# Patient Record
Sex: MALE | Race: WHITE | NOT HISPANIC OR LATINO | Employment: OTHER | URBAN - METROPOLITAN AREA
[De-identification: names, ages, dates, MRNs, and addresses within clinical notes are randomized per-mention and may not be internally consistent; named-entity substitution may affect disease eponyms.]

---

## 2017-09-09 ENCOUNTER — HOSPITAL ENCOUNTER (EMERGENCY)
Facility: HOSPITAL | Age: 52
Discharge: HOME | End: 2017-09-09
Payer: MEDICAID

## 2017-09-09 VITALS
SYSTOLIC BLOOD PRESSURE: 124 MMHG | TEMPERATURE: 98 F | HEIGHT: 69 IN | HEART RATE: 87 BPM | RESPIRATION RATE: 18 BRPM | BODY MASS INDEX: 19.99 KG/M2 | DIASTOLIC BLOOD PRESSURE: 83 MMHG | WEIGHT: 135 LBS

## 2017-09-09 DIAGNOSIS — L73.9 FOLLICULITIS: Primary | ICD-10-CM

## 2017-09-09 PROCEDURE — 99283 EMERGENCY DEPT VISIT LOW MDM: CPT

## 2017-09-09 RX ORDER — SULFAMETHOXAZOLE AND TRIMETHOPRIM 800; 160 MG/1; MG/1
1 TABLET ORAL 2 TIMES DAILY
Qty: 20 TABLET | Refills: 0 | Status: SHIPPED | OUTPATIENT
Start: 2017-09-09 | End: 2017-09-19

## 2017-09-09 NOTE — ED PROVIDER NOTES
SCRIBE #1 NOTE: I, Bing Mejia, am scribing for, and in the presence of,DENISE Fields . I have scribed the entire note.      History      Chief Complaint   Patient presents with    Wound Check     patient states his scar from May 2017 surgery itches and is red and swollen       Review of patient's allergies indicates:  No Known Allergies     HPI   HPI    9/9/2017, 3:03 PM   History obtained from the patient      History of Present Illness: Sagar Gabriel is a 52 y.o. male patient who presents to the Emergency Department for wound check. Pt states that he is experiencing itching over surgical scar  From may 2017. Symptoms are constant and moderate in severity.  No mitigating or exacerbating factors reported. Associated sxs include redness and swelling to site. Patient denies any fever, chills, increased warmth, drainage from the site, numbness/tingling, and all other sxs at this time. No prior Tx reported. No further complaints or concerns at this time.         Arrival mode: Personal vehicle     PCP: Sandra Mayo MD       Past Medical History:  No past medical history on file.    Past Surgical History:  No past surgical history on file.      Family History:  No family history on file.    Social History:  Social History     Social History Main Topics    Smoking status: Not on file    Smokeless tobacco: Not on file    Alcohol use Not on file    Drug use: Unknown    Sexual activity: Not on file       ROS   Review of Systems   Constitutional: Negative for chills and fever.   HENT: Negative for sore throat.    Respiratory: Negative for shortness of breath.    Cardiovascular: Negative for chest pain.   Gastrointestinal: Negative for nausea.   Genitourinary: Negative for dysuria.   Musculoskeletal: Negative for back pain.   Skin: Negative for rash.        - increased warmth  - drainage from the site  + itching   + redness  + swelling    Neurological: Negative for weakness and numbness.        - tingling   "  Hematological: Does not bruise/bleed easily.       Physical Exam      Initial Vitals [09/09/17 1454]   BP Pulse Resp Temp SpO2   124/83 87 18 97.8 °F (36.6 °C) --      MAP       96.67          Physical Exam  Nursing Notes and Vital Signs Reviewed.  Constitutional: Patient is in no apparent distress. Well-developed and well-nourished.  Head: Atraumatic. Normocephalic. 15 cm surgical scar to the scalp.  Partial craniectomy to L parietal skull. 1cm x 1cm area of tenderness and erythema.   Eyes: PERRL. EOM intact. Conjunctivae are not pale. No scleral icterus.  ENT: Mucous membranes are moist. Oropharynx is clear and symmetric.    Neck: Supple. Full ROM. No lymphadenopathy.  Cardiovascular: Regular rate. Regular rhythm. No murmurs, rubs, or gallops. Distal pulses are 2+ and symmetri.  Pulmonary/Chest: No respiratory distress. Clear to auscultation bilaterally. No wheezing, rales, or rhonchi.  Abdominal: Soft and non-distended.  There is no tenderness.  No rebound, guarding, or rigidity. Good bowel sounds.  Genitourinary: No CVA tenderness  Musculoskeletal: R below the knee amputee. No obvious deformities. No edema. No calf tenderness.  Skin: Warm and dry.  Neurological:  Alert, awake, and appropriate.  Normal speech.  No acute focal neurological deficits are appreciated.  Psychiatric: Normal affect. Good eye contact. Appropriate in content.    ED Course    Procedures  ED Vital Signs:  Vitals:    09/09/17 1454   BP: 124/83   Pulse: 87   Resp: 18   Temp: 97.8 °F (36.6 °C)   TempSrc: Oral   Weight: 61.2 kg (135 lb)   Height: 5' 9" (1.753 m)              The Emergency Provider reviewed the vital signs and test results, which are outlined above.    ED Discussion     3:07 PM:  Discussed with pt all pertinent ED information and results. Discussed pt dx and plan of tx. Gave pt all f/u and return to the ED instructions. All questions and concerns were addressed at this time. Pt expresses understanding of information and " instructions, and is comfortable with plan to discharge. Pt is stable for discharge.    I discussed wound care precautions with patient and/or family/caretaker; specifically that all wounds have risk of infection despite efforts to cleanse and debride the wound; and there is a risk of an occult foreign body (and thus increased risk of infection) despite a negative examination.  I discussed with patient need to return for any signs of infection, specifically redness, increased pain, fever, drainage of pus, or any concern, immediately.      ED Medication(s):  Medications - No data to display    New Prescriptions    No medications on file             Medical Decision Making              Scribe Attestation:   Scribe #1: I performed the above scribed service and the documentation accurately describes the services I performed. I attest to the accuracy of the note.    Attending:   Physician Attestation Statement for Scribe #1: I, DENISE Fields, personally performed the services described in this documentation, as scribed by , in my presence, and it is both accurate and complete.          Clinical Impression       ICD-10-CM ICD-9-CM   1. Folliculitis L73.9 704.8       Disposition:   Disposition: Discharged  Condition: Stable         DENISE Talley  09/09/17 1516

## 2017-09-13 ENCOUNTER — TELEPHONE (OUTPATIENT)
Dept: ORTHOPEDICS | Facility: CLINIC | Age: 52
End: 2017-09-13
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
## 2017-09-13 NOTE — TELEPHONE ENCOUNTER
Spoke with pt's significant other, Leticia.  States she was told that Dr Sanchez had 5 openings for New Medicaid pts.    She did not remember the name of the person that told her this info.l  Advised Leticia that DENISE Graham is not a MD.  She has no New Pt appointments for Medicaid pts until Jan 2018   That schedule is not yet available.

## 2018-03-16 DIAGNOSIS — M25.511 RIGHT SHOULDER PAIN, UNSPECIFIED CHRONICITY: Primary | ICD-10-CM

## 2018-03-19 ENCOUNTER — HOSPITAL ENCOUNTER (OUTPATIENT)
Dept: RADIOLOGY | Facility: HOSPITAL | Age: 53
Discharge: HOME | End: 2018-03-19
Attending: PHYSICIAN ASSISTANT
Payer: MEDICAID

## 2018-03-19 ENCOUNTER — OFFICE VISIT (OUTPATIENT)
Dept: ORTHOPEDICS | Facility: CLINIC | Age: 53
End: 2018-03-19
Payer: MEDICAID

## 2018-03-19 VITALS
BODY MASS INDEX: 24.34 KG/M2 | SYSTOLIC BLOOD PRESSURE: 112 MMHG | WEIGHT: 170 LBS | HEART RATE: 99 BPM | HEIGHT: 70 IN | DIASTOLIC BLOOD PRESSURE: 82 MMHG

## 2018-03-19 DIAGNOSIS — M12.811 ROTATOR CUFF ARTHROPATHY, RIGHT: ICD-10-CM

## 2018-03-19 DIAGNOSIS — M25.511 RIGHT SHOULDER PAIN, UNSPECIFIED CHRONICITY: ICD-10-CM

## 2018-03-19 DIAGNOSIS — S14.3XXA INJURY OF RIGHT BRACHIAL PLEXUS, INITIAL ENCOUNTER: Primary | ICD-10-CM

## 2018-03-19 DIAGNOSIS — S43.431S SUPERIOR GLENOID LABRUM LESION OF RIGHT SHOULDER, SEQUELA: ICD-10-CM

## 2018-03-19 DIAGNOSIS — Z78.9 HISTORY OF MOTORCYCLE ACCIDENT: ICD-10-CM

## 2018-03-19 PROCEDURE — 99204 OFFICE O/P NEW MOD 45 MIN: CPT | Mod: S$PBB,,, | Performed by: PHYSICIAN ASSISTANT

## 2018-03-19 PROCEDURE — 99214 OFFICE O/P EST MOD 30 MIN: CPT | Mod: PBBFAC,25 | Performed by: PHYSICIAN ASSISTANT

## 2018-03-19 PROCEDURE — 99999 PR PBB SHADOW E&M-EST. PATIENT-LVL IV: CPT | Mod: PBBFAC,,, | Performed by: PHYSICIAN ASSISTANT

## 2018-03-19 PROCEDURE — 73030 X-RAY EXAM OF SHOULDER: CPT | Mod: TC,RT

## 2018-03-19 PROCEDURE — 73030 X-RAY EXAM OF SHOULDER: CPT | Mod: 26,RT,, | Performed by: RADIOLOGY

## 2018-03-19 RX ORDER — VENLAFAXINE 75 MG/1
150 TABLET ORAL 2 TIMES DAILY
COMMUNITY
Start: 2018-03-08

## 2018-03-19 RX ORDER — TRAMADOL HYDROCHLORIDE 50 MG/1
100 TABLET ORAL 2 TIMES DAILY
COMMUNITY
Start: 2018-03-08 | End: 2019-03-08

## 2018-03-19 RX ORDER — GABAPENTIN 600 MG/1
1200 TABLET ORAL 3 TIMES DAILY
COMMUNITY
Start: 2018-03-08 | End: 2019-03-08

## 2018-03-19 RX ORDER — IBUPROFEN 600 MG/1
600 TABLET ORAL 2 TIMES DAILY PRN
COMMUNITY

## 2018-03-19 RX ORDER — QUETIAPINE FUMARATE 300 MG/1
300 TABLET, FILM COATED ORAL NIGHTLY
COMMUNITY
Start: 2018-03-08

## 2018-03-19 RX ORDER — DANTROLENE SODIUM 100 MG/1
100 CAPSULE ORAL 3 TIMES DAILY
COMMUNITY

## 2018-03-19 RX ORDER — FAMOTIDINE 20 MG/1
20 TABLET, FILM COATED ORAL 2 TIMES DAILY PRN
COMMUNITY
Start: 2018-03-08 | End: 2019-03-08
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
## 2018-04-10 ENCOUNTER — TELEPHONE (OUTPATIENT)
Dept: ORTHOPEDICS | Facility: CLINIC | Age: 53
End: 2018-04-10

## 2018-04-10 NOTE — TELEPHONE ENCOUNTER
Returned pt mom call. Pt mother wanted to know if we received the EMG she dropped off. Advised her it was received. Pt mother requested a call back from provider to discuss the next step.

## 2018-04-10 NOTE — TELEPHONE ENCOUNTER
----- Message from Mar Mack sent at 4/10/2018  2:34 PM CDT -----  Contact: [place] - [phone]  Would like to consult with the nurse about reports, please call back at [phone].  Thjx-AH

## 2018-04-19 ENCOUNTER — TELEPHONE (OUTPATIENT)
Dept: ORTHOPEDICS | Facility: CLINIC | Age: 53
End: 2018-04-19

## 2018-04-19 NOTE — TELEPHONE ENCOUNTER
Spoke with pt letting him know that I will speak with Chely tomorrow morning and we will give him a call back.

## 2018-04-19 NOTE — TELEPHONE ENCOUNTER
----- Message from Salud Biswas sent at 4/19/2018  8:43 AM CDT -----  Contact: pt  He's calling to verify if he will be scheduled for surgery, please advise [phone] (work)

## 2018-04-25 ENCOUNTER — TELEPHONE (OUTPATIENT)
Dept: ORTHOPEDICS | Facility: CLINIC | Age: 53
End: 2018-04-25

## 2018-04-25 DIAGNOSIS — S14.3XXA INJURY OF RIGHT BRACHIAL PLEXUS, INITIAL ENCOUNTER: Primary | ICD-10-CM

## 2018-04-25 NOTE — TELEPHONE ENCOUNTER
Returned pt phone call.pt was unavailable. Left message for pt to call back at earliest convenience. Pt needs referral to  in [facility]

## 2018-04-25 NOTE — TELEPHONE ENCOUNTER
----- Message from Joan Saxena sent at 4/24/2018  6:08 PM CDT -----  Contact: self   [phone]  Pt is calling to speak with the nurse with the nurse concerning pt surgery date.      Please give a call back    Thank you!

## 2018-04-30 DIAGNOSIS — R93.7 ABNORMAL X-RAY OF CERVICAL SPINE: ICD-10-CM

## 2018-05-09 ENCOUNTER — HOSPITAL ENCOUNTER (OUTPATIENT)
Dept: RADIOLOGY | Facility: HOSPITAL | Age: 53
Discharge: HOME | End: 2018-05-09
Attending: PHYSICIAN ASSISTANT
Payer: MEDICAID

## 2018-05-09 DIAGNOSIS — R93.7 ABNORMAL X-RAY OF CERVICAL SPINE: ICD-10-CM

## 2018-05-09 PROCEDURE — 72141 MRI NECK SPINE W/O DYE: CPT | Mod: TC

## 2018-06-11 ENCOUNTER — TELEPHONE (OUTPATIENT)
Dept: SPINE | Facility: CLINIC | Age: 53
End: 2018-06-11

## 2018-06-11 DIAGNOSIS — M54.2 NECK PAIN: Primary | ICD-10-CM

## 2018-06-12 ENCOUNTER — TELEPHONE (OUTPATIENT)
Dept: SPINE | Facility: CLINIC | Age: 53
End: 2018-06-12

## 2018-06-12 NOTE — TELEPHONE ENCOUNTER
----- Message from Coretta Tapia sent at 6/12/2018  9:55 AM CDT -----  Contact: pt's mother,sarah            Name of Who is Calling:Sarah      What is the request in detail: pt's mother requesting a later time for pt's appt today or if he can be seen tomorrow. Pt's  was unable to pick him up and bring him at the time. Please call and advise/      Can the clinic reply by [facility]: no      What Number to Call Back if not in [facility]: [phone]

## 2018-06-26 ENCOUNTER — OFFICE VISIT (OUTPATIENT)
Dept: SPINE | Facility: CLINIC | Age: 53
End: 2018-06-26
Attending: NEUROLOGICAL SURGERY
Payer: MEDICAID

## 2018-06-26 VITALS — SYSTOLIC BLOOD PRESSURE: 132 MMHG | HEART RATE: 77 BPM | HEIGHT: 70 IN | DIASTOLIC BLOOD PRESSURE: 85 MMHG

## 2018-06-26 DIAGNOSIS — S14.3XXA INJURY OF RIGHT BRACHIAL PLEXUS, INITIAL ENCOUNTER: ICD-10-CM

## 2018-06-26 DIAGNOSIS — S49.90XA SHOULDER INJURY, INITIAL ENCOUNTER: Primary | ICD-10-CM

## 2018-06-26 PROCEDURE — 99999 PR PBB SHADOW E&M-EST. PATIENT-LVL III: CPT | Mod: PBBFAC,,, | Performed by: NEUROLOGICAL SURGERY

## 2018-06-26 PROCEDURE — 99205 OFFICE O/P NEW HI 60 MIN: CPT | Mod: S$PBB,,, | Performed by: NEUROLOGICAL SURGERY

## 2018-06-26 PROCEDURE — 99213 OFFICE O/P EST LOW 20 MIN: CPT | Mod: PBBFAC | Performed by: NEUROLOGICAL SURGERY

## 2018-06-26 NOTE — LETTER
June 26, 2018      Chely Hi PA-C  [address] [place] [address]           [facility]  [address] [place] [address], [address]  [facility] [address]  Phone: [phone]  Fax: [phone]          Patient: Sagar Gabriel   MR Number: [id number]   YOB: 1965   Date of Visit: 6/26/2018       Dear Chely Hi:    Thank you for referring Sagar Gabriel to me for evaluation. Attached you will find relevant portions of my assessment and plan of care.    If you have questions, please do not hesitate to call me. I look forward to following Sagar Gabriel along with you.    Sincerely,    Luis Angel Hough MD    Enclosure  CC:  No Recipients    If you would like to receive this communication electronically, please contact [email or web address] or [phone] to request more information on [employer] access.    For providers and/or their staff who would like to refer a patient to [facility], please contact us through our one-stop-shop provider referral line, [facility], at [phone].    If you feel you have received this communication in error or would no longer like to receive these types of communications, please e-mail [email or web address]
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
## 2018-06-28 ENCOUNTER — TELEPHONE (OUTPATIENT)
Dept: ORTHOPEDICS | Facility: CLINIC | Age: 53
End: 2018-06-28

## 2018-06-28 NOTE — TELEPHONE ENCOUNTER
----- Message from Isabel Da Silva sent at 6/28/2018 12:52 PM CDT -----  Contact: Patients wife, Sarah Smith states that the Dr In [place] said he does not see her husbands condition, advised that patient needs an orthopedic surgeon. Please call Ms Smith regarding this, please call her back at [phone]. Thank you

## 2018-06-28 NOTE — TELEPHONE ENCOUNTER
Returned pt mother's phone call. Pt mother states pt needs to see an orthopedic surgeon for his shoulder. Advised pt mother I would speak with our surgeons here in clinic and see if they know anyone that could do it.

## 2018-06-29 ENCOUNTER — TELEPHONE (OUTPATIENT)
Dept: ORTHOPEDICS | Facility: CLINIC | Age: 53
End: 2018-06-29

## 2018-06-29 NOTE — TELEPHONE ENCOUNTER
Pt mother would like pt to see an orthopedic surgeon in [place] rather than [place]. Advised pt mother a referral to [facility] was sent and she should receive a call sometime next week. Pt mother vocalized understanding.

## 2018-07-17 ENCOUNTER — TELEPHONE (OUTPATIENT)
Dept: ORTHOPEDICS | Facility: CLINIC | Age: 53
End: 2018-07-17

## 2018-07-17 NOTE — TELEPHONE ENCOUNTER
"[facility]ed [facility] was [facility].   Referral was denied and stated "he is not a surgical candidate"  Referral denial is being scanned into media.     Called [facility] ortho (dept that Stephane Hough originally sent for pt) they are not accepting medicaid at this time.       Called pt's mother to inform her of denial.   Told her that I would be speaking to my management team on how to proceed.   Mother verbalized understanding.    "

## 2018-07-17 NOTE — TELEPHONE ENCOUNTER
----- Message from Suzy Joy sent at 7/17/2018 10:23 AM CDT -----  Contact: Sarah Nery/mother  States she's calling regarding someone is suppose to call with an orthopedic surgeon he can see. Please call Sarah Gabriel at [phone]. Thank you

## 2018-07-26 ENCOUNTER — TELEPHONE (OUTPATIENT)
Dept: ORTHOPEDICS | Facility: CLINIC | Age: 53
End: 2018-07-26

## 2018-07-26 NOTE — TELEPHONE ENCOUNTER
----- Message from Sha Pugh sent at 7/26/2018 10:44 AM CDT -----  Contact: Pt/Mom   Please give pt mom a call at [phone] regarding the pt shoulder.

## 2018-07-26 NOTE — TELEPHONE ENCOUNTER
Returned call to patients mom and informed her that we will get with our manager after his meeting and call her back after we talk to him.

## 2018-07-31 ENCOUNTER — TELEPHONE (OUTPATIENT)
Dept: ORTHOPEDICS | Facility: CLINIC | Age: 53
End: 2018-07-31

## 2018-08-13 ENCOUNTER — TELEPHONE (OUTPATIENT)
Dept: ORTHOPEDICS | Facility: CLINIC | Age: 53
End: 2018-08-13

## 2018-08-13 NOTE — TELEPHONE ENCOUNTER
----- Message from Jayde Iraheta sent at 8/13/2018 10:51 AM CDT -----  Caller needs call back rg patient referral to orthopedics Doctor [phone]

## 2023-09-13 NOTE — TELEPHONE ENCOUNTER
After speaking with manager he recommended sending referral to [place].     Called mom to inform her that we are sending referral to [facility] orth[facility].     Fax number [phone]  Pt's mom verbalized understanding and will call us back if they do not hear from someone.   Labs/Imaging Studies/Medications